# Patient Record
Sex: MALE | Race: AMERICAN INDIAN OR ALASKA NATIVE
[De-identification: names, ages, dates, MRNs, and addresses within clinical notes are randomized per-mention and may not be internally consistent; named-entity substitution may affect disease eponyms.]

---

## 2019-03-07 ENCOUNTER — HOSPITAL ENCOUNTER (EMERGENCY)
Dept: HOSPITAL 5 - ED | Age: 34
Discharge: HOME | End: 2019-03-07
Payer: COMMERCIAL

## 2019-03-07 VITALS — SYSTOLIC BLOOD PRESSURE: 134 MMHG | DIASTOLIC BLOOD PRESSURE: 78 MMHG

## 2019-03-07 DIAGNOSIS — Z88.6: ICD-10-CM

## 2019-03-07 DIAGNOSIS — J20.9: Primary | ICD-10-CM

## 2019-03-07 PROCEDURE — 71046 X-RAY EXAM CHEST 2 VIEWS: CPT

## 2019-03-07 NOTE — EMERGENCY DEPARTMENT REPORT
Minor Respiratory





- HPI


Chief Complaint: Upper Respiratory Infection


Stated Complaint: POSS RESPIRATORY INFECTION


Time Seen by Provider: 03/07/19 15:02


Duration: greater than one week


Pain Location: Chest


Severity: mild


Minor Respiratory: Yes Able to Tolerate Fluids, Yes Cough, No Rhinorrhea, No 

Sore Throat, No Sick Contacts, No Hemoptysis, No Chest Pain, No Shortness of 

Breath, No Fever


Other History: Mr. Carbajal is a very pleasant healthy 32 yo gentleman who presents

with cough for greater than one week.  He had flu like symptoms of fever body 

aches last week which have resolved.  He feels fluid in his lungs.  He smokes 

tobacco and marijuana.  He does also vape.





ED Review of Systems


ROS: 


Stated complaint: POSS RESPIRATORY INFECTION


Other details as noted in HPI





Constitutional: denies: fever, malaise


Respiratory: cough.  denies: shortness of breath, wheezing


Cardiovascular: denies: palpitations


Gastrointestinal: denies: abdominal pain, nausea, vomiting


Neurological: denies: headache





ED Past Medical Hx





- Past Medical History


Previous Medical History?: No





- Surgical History


Past Surgical History?: No





- Social History


Smoking Status: Never Smoker


Substance Use Type: None





- Medications


Home Medications: 


                                Home Medications











 Medication  Instructions  Recorded  Confirmed  Last Taken  Type


 


Doxycycline Hyclate [Doxycycline 100 mg PO Q12HR 7 Days #14 tab 03/07/19  

Unknown Rx





Hyclate TAB]     














Minor Respiratory Exam





- Exam


General: 


Vital signs noted. No distress. Alert and acting appropriately.


Very pleasant, talkative, no acute distress.  Frequent harsh cough


HEENT: Yes Moist Mucous Membranes, No Pharyngeal Erythema, No Pharyngeal 

Exudates, No Rhinorrhea, No Conjuctival Injection, No Frontal Tenderness, No 

Maxillary Tenderness


Neck: Yes Supple, No Adenopathy


Lungs: Yes Good Air Exchange, No Wheezes, No Ronchi, No Stridor, No Cough, No 

Labored Respirations, No Retractions, No Use of Accessory Muscles, No Other 

Abnormal Lung Sounds


Heart: Yes Regular, No Murmur


Abdomen: Yes Normal Bowel Sounds, No Tenderness, No Peritoneal Signs


Skin: No Rash, No Edema


Neurologic: 


Alert and oriented, no deficits.








Musculoskeletal: 


Unremarkable.











ED Course


                                   Vital Signs











  03/07/19





  15:23


 


Temperature 98.8 F


 


Pulse Rate 88


 


Respiratory 16





Rate 


 


Blood Pressure 134/78





[Left] 


 


O2 Sat by Pulse 97





Oximetry 














ED Medical Decision Making





- Radiology Data


Radiology results: report reviewed





Chest x-ray 2 view: No acute process according to radiology report





- Medical Decision Making





Mrs. aCrbajal presents with acute bronchitis: Antibiotics are indicated due to 

tobacco use.  Prescribed doxycycline.


Critical care attestation.: 


If time is entered above; I have spent that time in minutes in the direct care 

of this critically ill patient, excluding procedure time.








ED Disposition


Clinical Impression: 


 Acute bronchitis





Disposition: DC-01 TO HOME OR SELFCARE


Is pt being admited?: No


Does the pt Need Aspirin: No


Condition: Stable


Instructions:  Acute Bronchitis (ED)


Prescriptions: 


Doxycycline Hyclate [Doxycycline Hyclate TAB] 100 mg PO Q12HR 7 Days #14 tab


Referrals: 


OSCAR KNOTT DO [Staff Physician] - 3-5 Days

## 2019-03-07 NOTE — XRAY REPORT
PROCEDURE: XR CHEST ROUTINE 2V 

 

TECHNIQUE:  Chest radiograph, frontal lateral views. 

 

HISTORY: cough 

 

COMPARISONS: None currently available. 

 

FINDINGS: 

Cardiac silhouette is within normal limits. 

 

There is no effusion. There is no pneumothorax. There is no consolidation.  

 

There are no suspicious osseous lesions. 

 

IMPRESSION: 

*  No acute cardiopulmonary findings.   

 

This document is electronically signed by Greg Mccain MD., March 7 2019 04:17:00 PM ET